# Patient Record
Sex: FEMALE | Race: WHITE | NOT HISPANIC OR LATINO | Employment: UNEMPLOYED | URBAN - METROPOLITAN AREA
[De-identification: names, ages, dates, MRNs, and addresses within clinical notes are randomized per-mention and may not be internally consistent; named-entity substitution may affect disease eponyms.]

---

## 2021-03-31 ENCOUNTER — HOSPITAL ENCOUNTER (EMERGENCY)
Facility: HOSPITAL | Age: 66
Discharge: HOME | End: 2021-03-31
Payer: MEDICARE

## 2021-03-31 VITALS
BODY MASS INDEX: 22.49 KG/M2 | OXYGEN SATURATION: 98 % | TEMPERATURE: 98 F | WEIGHT: 135 LBS | HEART RATE: 78 BPM | SYSTOLIC BLOOD PRESSURE: 130 MMHG | DIASTOLIC BLOOD PRESSURE: 72 MMHG | HEIGHT: 65 IN | RESPIRATION RATE: 20 BRPM

## 2021-03-31 DIAGNOSIS — M79.642 LEFT HAND PAIN: ICD-10-CM

## 2021-03-31 DIAGNOSIS — S63.502A SPRAIN OF LEFT WRIST, INITIAL ENCOUNTER: Primary | ICD-10-CM

## 2021-03-31 PROCEDURE — 73130 X-RAY EXAM OF HAND: CPT | Mod: TC,FY,LT

## 2021-03-31 PROCEDURE — 73130 XR HAND COMPLETE 3 VIEW LEFT: ICD-10-PCS | Mod: 26,LT,, | Performed by: RADIOLOGY

## 2021-03-31 PROCEDURE — 99283 EMERGENCY DEPT VISIT LOW MDM: CPT | Mod: 25

## 2021-03-31 PROCEDURE — 73130 X-RAY EXAM OF HAND: CPT | Mod: 26,LT,, | Performed by: RADIOLOGY

## 2022-12-27 ENCOUNTER — HOSPITAL ENCOUNTER (EMERGENCY)
Facility: HOSPITAL | Age: 67
Discharge: ANOTHER HOSPITAL | End: 2022-12-27
Attending: FAMILY MEDICINE
Payer: MEDICARE

## 2022-12-27 VITALS
HEIGHT: 65 IN | RESPIRATION RATE: 10 BRPM | SYSTOLIC BLOOD PRESSURE: 119 MMHG | DIASTOLIC BLOOD PRESSURE: 66 MMHG | TEMPERATURE: 99 F | OXYGEN SATURATION: 96 % | BODY MASS INDEX: 23.99 KG/M2 | WEIGHT: 144 LBS | HEART RATE: 66 BPM

## 2022-12-27 DIAGNOSIS — K35.80 ACUTE APPENDICITIS, UNSPECIFIED ACUTE APPENDICITIS TYPE: Primary | ICD-10-CM

## 2022-12-27 LAB
ALBUMIN SERPL BCP-MCNC: 4.1 G/DL (ref 3.5–5.2)
ALP SERPL-CCNC: 75 U/L (ref 55–135)
ALT SERPL W/O P-5'-P-CCNC: 36 U/L (ref 10–44)
ANION GAP SERPL CALC-SCNC: 13 MMOL/L (ref 8–16)
AST SERPL-CCNC: 25 U/L (ref 10–40)
BASOPHILS # BLD AUTO: 0.04 K/UL (ref 0–0.2)
BASOPHILS NFR BLD: 0.3 % (ref 0–1.9)
BILIRUB SERPL-MCNC: 1.2 MG/DL (ref 0.1–1)
BILIRUB UR QL STRIP: NEGATIVE
BUN SERPL-MCNC: 12 MG/DL (ref 8–23)
CALCIUM SERPL-MCNC: 9.3 MG/DL (ref 8.7–10.5)
CHLORIDE SERPL-SCNC: 106 MMOL/L (ref 95–110)
CLARITY UR: CLEAR
CO2 SERPL-SCNC: 21 MMOL/L (ref 23–29)
COLOR UR: YELLOW
CREAT SERPL-MCNC: 0.9 MG/DL (ref 0.5–1.4)
DIFFERENTIAL METHOD: ABNORMAL
EOSINOPHIL # BLD AUTO: 0.1 K/UL (ref 0–0.5)
EOSINOPHIL NFR BLD: 0.5 % (ref 0–8)
ERYTHROCYTE [DISTWIDTH] IN BLOOD BY AUTOMATED COUNT: 12.5 % (ref 11.5–14.5)
EST. GFR  (NO RACE VARIABLE): >60 ML/MIN/1.73 M^2
GLUCOSE SERPL-MCNC: 171 MG/DL (ref 70–110)
GLUCOSE UR QL STRIP: NEGATIVE
HCT VFR BLD AUTO: 38.5 % (ref 37–48.5)
HCV AB SERPL QL IA: NORMAL
HGB BLD-MCNC: 13 G/DL (ref 12–16)
HGB UR QL STRIP: ABNORMAL
HIV 1+2 AB+HIV1 P24 AG SERPL QL IA: NORMAL
IMM GRANULOCYTES # BLD AUTO: 0.07 K/UL (ref 0–0.04)
IMM GRANULOCYTES NFR BLD AUTO: 0.5 % (ref 0–0.5)
KETONES UR QL STRIP: ABNORMAL
LEUKOCYTE ESTERASE UR QL STRIP: ABNORMAL
LIPASE SERPL-CCNC: 25 U/L (ref 4–60)
LYMPHOCYTES # BLD AUTO: 1.3 K/UL (ref 1–4.8)
LYMPHOCYTES NFR BLD: 9.6 % (ref 18–48)
MCH RBC QN AUTO: 31 PG (ref 27–31)
MCHC RBC AUTO-ENTMCNC: 33.8 G/DL (ref 32–36)
MCV RBC AUTO: 92 FL (ref 82–98)
MICROSCOPIC COMMENT: ABNORMAL
MONOCYTES # BLD AUTO: 0.5 K/UL (ref 0.3–1)
MONOCYTES NFR BLD: 3.6 % (ref 4–15)
NEUTROPHILS # BLD AUTO: 11.7 K/UL (ref 1.8–7.7)
NEUTROPHILS NFR BLD: 85.5 % (ref 38–73)
NITRITE UR QL STRIP: NEGATIVE
NRBC BLD-RTO: 0 /100 WBC
PH UR STRIP: 6 [PH] (ref 5–8)
PLATELET # BLD AUTO: 215 K/UL (ref 150–450)
PMV BLD AUTO: 9.9 FL (ref 9.2–12.9)
POTASSIUM SERPL-SCNC: 3.6 MMOL/L (ref 3.5–5.1)
PROT SERPL-MCNC: 6.8 G/DL (ref 6–8.4)
PROT UR QL STRIP: NEGATIVE
RBC # BLD AUTO: 4.19 M/UL (ref 4–5.4)
RBC #/AREA URNS HPF: 8 /HPF (ref 0–4)
SODIUM SERPL-SCNC: 140 MMOL/L (ref 136–145)
SP GR UR STRIP: >=1.03 (ref 1–1.03)
URN SPEC COLLECT METH UR: ABNORMAL
UROBILINOGEN UR STRIP-ACNC: NEGATIVE EU/DL
WBC # BLD AUTO: 13.7 K/UL (ref 3.9–12.7)
WBC #/AREA URNS HPF: 3 /HPF (ref 0–5)

## 2022-12-27 PROCEDURE — 63600175 PHARM REV CODE 636 W HCPCS: Performed by: NURSE PRACTITIONER

## 2022-12-27 PROCEDURE — 63600175 PHARM REV CODE 636 W HCPCS: Performed by: FAMILY MEDICINE

## 2022-12-27 PROCEDURE — 96361 HYDRATE IV INFUSION ADD-ON: CPT

## 2022-12-27 PROCEDURE — 96376 TX/PRO/DX INJ SAME DRUG ADON: CPT

## 2022-12-27 PROCEDURE — 74176 CT ABDOMEN PELVIS WITHOUT CONTRAST: ICD-10-PCS | Mod: 26,,, | Performed by: RADIOLOGY

## 2022-12-27 PROCEDURE — 74176 CT ABD & PELVIS W/O CONTRAST: CPT | Mod: 26,,, | Performed by: RADIOLOGY

## 2022-12-27 PROCEDURE — 99285 EMERGENCY DEPT VISIT HI MDM: CPT | Mod: 25

## 2022-12-27 PROCEDURE — 81000 URINALYSIS NONAUTO W/SCOPE: CPT | Performed by: NURSE PRACTITIONER

## 2022-12-27 PROCEDURE — 86803 HEPATITIS C AB TEST: CPT | Performed by: FAMILY MEDICINE

## 2022-12-27 PROCEDURE — 96374 THER/PROPH/DIAG INJ IV PUSH: CPT

## 2022-12-27 PROCEDURE — 74176 CT ABD & PELVIS W/O CONTRAST: CPT | Mod: TC

## 2022-12-27 PROCEDURE — 83690 ASSAY OF LIPASE: CPT | Performed by: NURSE PRACTITIONER

## 2022-12-27 PROCEDURE — 25000003 PHARM REV CODE 250: Performed by: NURSE PRACTITIONER

## 2022-12-27 PROCEDURE — 87389 HIV-1 AG W/HIV-1&-2 AB AG IA: CPT | Performed by: FAMILY MEDICINE

## 2022-12-27 PROCEDURE — 80053 COMPREHEN METABOLIC PANEL: CPT | Performed by: NURSE PRACTITIONER

## 2022-12-27 PROCEDURE — 96375 TX/PRO/DX INJ NEW DRUG ADDON: CPT

## 2022-12-27 PROCEDURE — 85025 COMPLETE CBC W/AUTO DIFF WBC: CPT | Performed by: NURSE PRACTITIONER

## 2022-12-27 RX ORDER — LEVOTHYROXINE SODIUM 50 UG/1
50 TABLET ORAL EVERY MORNING
COMMUNITY
Start: 2022-11-17

## 2022-12-27 RX ORDER — ZOLPIDEM TARTRATE 5 MG/1
5 TABLET ORAL NIGHTLY PRN
COMMUNITY
Start: 2022-11-28

## 2022-12-27 RX ORDER — MORPHINE SULFATE 4 MG/ML
2 INJECTION, SOLUTION INTRAMUSCULAR; INTRAVENOUS
Status: COMPLETED | OUTPATIENT
Start: 2022-12-27 | End: 2022-12-27

## 2022-12-27 RX ORDER — KETOROLAC TROMETHAMINE 30 MG/ML
15 INJECTION, SOLUTION INTRAMUSCULAR; INTRAVENOUS
Status: DISCONTINUED | OUTPATIENT
Start: 2022-12-27 | End: 2022-12-27

## 2022-12-27 RX ORDER — ONDANSETRON 2 MG/ML
4 INJECTION INTRAMUSCULAR; INTRAVENOUS
Status: COMPLETED | OUTPATIENT
Start: 2022-12-27 | End: 2022-12-27

## 2022-12-27 RX ORDER — ATORVASTATIN CALCIUM 10 MG/1
10 TABLET, FILM COATED ORAL
COMMUNITY
Start: 2022-09-22

## 2022-12-27 RX ADMIN — ONDANSETRON 4 MG: 2 INJECTION INTRAMUSCULAR; INTRAVENOUS at 10:12

## 2022-12-27 RX ADMIN — MORPHINE SULFATE 2 MG: 4 INJECTION INTRAVENOUS at 03:12

## 2022-12-27 RX ADMIN — ONDANSETRON 4 MG: 2 INJECTION INTRAMUSCULAR; INTRAVENOUS at 12:12

## 2022-12-27 RX ADMIN — MORPHINE SULFATE 2 MG: 4 INJECTION INTRAVENOUS at 12:12

## 2022-12-27 RX ADMIN — MORPHINE SULFATE 2 MG: 4 INJECTION INTRAVENOUS at 11:12

## 2022-12-27 RX ADMIN — SODIUM CHLORIDE 1000 ML: 9 INJECTION, SOLUTION INTRAVENOUS at 10:12

## 2022-12-27 NOTE — ED NOTES
In to see pt at this time. Pt aaox4. Pt states she started having abdominal pain last night that has progressively gotten worse and started having n/v this morning. Pt states she has had no other precipitating symptoms apart from the abdominal pain. Pt states she is having pain of 8/10 in her abd at this time. Pt has no other needs or complaints at this time.

## 2022-12-27 NOTE — ED PROVIDER NOTES
Encounter Date: 12/27/2022       History     Chief Complaint   Patient presents with    Abdominal Pain     Generalized abdominal pain starting last night with associated n/v. Pt reports soft bm this am.      67-year-old female presents to the ED complaining of periumbilical abdominal pain associated with nausea vomiting morning the abdominal pain has more localized to the right lower quadrant, no history fever chills the patient has had loss of appetite her last meal was last night over 12 hours prior to arrival she has no history of abdominal surgery    Review of patient's allergies indicates:  No Known Allergies  Past Medical History:   Diagnosis Date    High cholesterol     Hypothyroidism, unspecified      History reviewed. No pertinent surgical history.  No family history on file.  Social History     Tobacco Use    Smoking status: Never   Substance Use Topics    Alcohol use: Never    Drug use: Never     Review of Systems   Constitutional:  Negative for fever.   HENT:  Negative for sore throat.    Respiratory:  Negative for shortness of breath.    Cardiovascular:  Negative for chest pain.   Gastrointestinal:  Positive for abdominal pain and nausea. Negative for anal bleeding, blood in stool and constipation.   Genitourinary:  Negative for dysuria.   Musculoskeletal:  Negative for back pain.   Skin:  Negative for rash.   Neurological:  Negative for weakness.   Hematological:  Does not bruise/bleed easily.   Psychiatric/Behavioral:  Negative for agitation.      Physical Exam     Initial Vitals [12/27/22 1029]   BP Pulse Resp Temp SpO2   (!) 100/59 (!) 52 (!) 24 97.8 °F (36.6 °C) 100 %      MAP       --         Physical Exam    Nursing note and vitals reviewed.  Constitutional: She appears well-developed and well-nourished. She is not diaphoretic. No distress.   HENT:   Head: Normocephalic and atraumatic.   Eyes: Pupils are equal, round, and reactive to light. Right eye exhibits no discharge. Left eye exhibits  no discharge.   Neck: No tracheal deviation present. No JVD present.   Cardiovascular:      Exam reveals no friction rub.       No murmur heard.  Pulmonary/Chest: No stridor. No respiratory distress. She has no wheezes. She has no rales.   Abdominal: Bowel sounds are normal. She exhibits no distension. There is abdominal tenderness. There is no rebound and no guarding.   Musculoskeletal:         General: Normal range of motion.     Neurological: She is alert.   Skin: Skin is warm.   Psychiatric: She has a normal mood and affect.       ED Course   Procedures  Labs Reviewed   CBC W/ AUTO DIFFERENTIAL - Abnormal; Notable for the following components:       Result Value    WBC 13.70 (*)     Gran # (ANC) 11.7 (*)     Immature Grans (Abs) 0.07 (*)     Gran % 85.5 (*)     Lymph % 9.6 (*)     Mono % 3.6 (*)     All other components within normal limits    Narrative:     Release to patient->Immediate   COMPREHENSIVE METABOLIC PANEL - Abnormal; Notable for the following components:    CO2 21 (*)     Glucose 171 (*)     Total Bilirubin 1.2 (*)     All other components within normal limits    Narrative:     Release to patient->Immediate   URINALYSIS, REFLEX TO URINE CULTURE - Abnormal; Notable for the following components:    Specific Gravity, UA >=1.030 (*)     Ketones, UA 3+ (*)     Occult Blood UA 2+ (*)     Leukocytes, UA Trace (*)     All other components within normal limits    Narrative:     Preferred Collection Type->Urine, Clean Catch  Specimen Source->Urine   URINALYSIS MICROSCOPIC - Abnormal; Notable for the following components:    RBC, UA 8 (*)     All other components within normal limits    Narrative:     Preferred Collection Type->Urine, Clean Catch  Specimen Source->Urine   HIV 1 / 2 ANTIBODY    Narrative:     Release to patient->Immediate   HEPATITIS C ANTIBODY    Narrative:     Release to patient->Immediate   LIPASE    Narrative:     Release to patient->Immediate          Imaging Results              CT  Abdomen Pelvis  Without Contrast (Final result)  Result time 12/27/22 12:16:17      Final result by Diana Reece MD (12/27/22 12:16:17)                   Impression:      Findings consistent acute appendicitis without perforation or abscess.    Results called to Dr. Tate at 12:15 hours 12/27/2022      Electronically signed by: Diana Reece MD  Date:    12/27/2022  Time:    12:16               Narrative:    EXAMINATION:  CT ABDOMEN PELVIS WITHOUT CONTRAST    CLINICAL HISTORY:  Abdominal pain, acute, nonlocalized;    TECHNIQUE:  Low dose axial images, sagittal and coronal reformations were obtained from the lung bases to the pubic symphysis.  No p.o. or IV contrast    COMPARISON:  None    FINDINGS:  There are mild hypoventilatory changes in visualized lung bases    Liver and spleen unremarkable appearance. No calcified stones the gallbladder or CT findings of acute cholecystitis. No biliary duct dilatation. Adrenal glands unremarkable appearance. Pancreas unremarkable appearance.  Abdominal aorta tapers without aneurysmal dilatation    Kidneys unremarkable appearance. No hydronephrosis opaque renal or ureteral stone or ureteral obstruction. Urinary bladder mildly distended at time of the exam and as visualized is unremarkable in appearance    Reproductive organs as visualized unremarkable appearance for the patient's age    Osseous structures; degenerative changes without obvious aggressive appearing osseous lesion    Stomach, bowel, mesentery; mild diverticulosis without CT findings of acute diverticulitis    Dilated thick-walled appendix with periappendiceal fat stranding and appendicolith. Appendix measuring approximately 11 mm in diameter.    No free intraperitoneal air or fluid. No abscess.                                       Medications   ondansetron injection 4 mg (4 mg Intravenous Given 12/27/22 1054)   sodium chloride 0.9% bolus 1,000 mL 1,000 mL (0 mLs Intravenous Stopped 12/27/22 1159)    morphine injection 2 mg (2 mg Intravenous Given 12/27/22 1140)   ondansetron injection 4 mg (4 mg Intravenous Given 12/27/22 1238)   morphine injection 2 mg (2 mg Intravenous Given 12/27/22 1238)   morphine injection 2 mg (2 mg Intravenous Given 12/27/22 1518)                 ED Course as of 01/02/23 0750   Tue Dec 27, 2022   1225 There is no general surgeon on-call at [facility] today transfer center coordinator contacted [WK]   1706 Patient resting quietly, patient will be going to [facility] [WK]      ED Course User Index  [WK] Reji Dukes MD                 Clinical Impression:   Final diagnoses:  [K35.80] Acute appendicitis, unspecified acute appendicitis type (Primary)        ED Disposition Condition    Transfer to Another Facility Stable                Reji Dukes MD  01/02/23 8554

## 2023-02-09 ENCOUNTER — OFFICE VISIT (OUTPATIENT)
Dept: PODIATRY | Facility: CLINIC | Age: 68
End: 2023-02-09
Payer: MEDICARE

## 2023-02-09 VITALS
BODY MASS INDEX: 23.72 KG/M2 | HEART RATE: 83 BPM | DIASTOLIC BLOOD PRESSURE: 77 MMHG | WEIGHT: 142.38 LBS | HEIGHT: 65 IN | SYSTOLIC BLOOD PRESSURE: 112 MMHG

## 2023-02-09 DIAGNOSIS — M72.2 PLANTAR FASCIITIS: Primary | ICD-10-CM

## 2023-02-09 PROCEDURE — 1160F RVW MEDS BY RX/DR IN RCRD: CPT | Mod: CPTII,S$GLB,, | Performed by: PODIATRIST

## 2023-02-09 PROCEDURE — 3074F PR MOST RECENT SYSTOLIC BLOOD PRESSURE < 130 MM HG: ICD-10-PCS | Mod: CPTII,S$GLB,, | Performed by: PODIATRIST

## 2023-02-09 PROCEDURE — 1125F AMNT PAIN NOTED PAIN PRSNT: CPT | Mod: CPTII,S$GLB,, | Performed by: PODIATRIST

## 2023-02-09 PROCEDURE — 3008F BODY MASS INDEX DOCD: CPT | Mod: CPTII,S$GLB,, | Performed by: PODIATRIST

## 2023-02-09 PROCEDURE — 3008F PR BODY MASS INDEX (BMI) DOCUMENTED: ICD-10-PCS | Mod: CPTII,S$GLB,, | Performed by: PODIATRIST

## 2023-02-09 PROCEDURE — 99999 PR PBB SHADOW E&M-EST. PATIENT-LVL III: CPT | Mod: PBBFAC,,, | Performed by: PODIATRIST

## 2023-02-09 PROCEDURE — 1159F PR MEDICATION LIST DOCUMENTED IN MEDICAL RECORD: ICD-10-PCS | Mod: CPTII,S$GLB,, | Performed by: PODIATRIST

## 2023-02-09 PROCEDURE — 1159F MED LIST DOCD IN RCRD: CPT | Mod: CPTII,S$GLB,, | Performed by: PODIATRIST

## 2023-02-09 PROCEDURE — 1160F PR REVIEW ALL MEDS BY PRESCRIBER/CLIN PHARMACIST DOCUMENTED: ICD-10-PCS | Mod: CPTII,S$GLB,, | Performed by: PODIATRIST

## 2023-02-09 PROCEDURE — 99203 OFFICE O/P NEW LOW 30 MIN: CPT | Mod: S$GLB,,, | Performed by: PODIATRIST

## 2023-02-09 PROCEDURE — 3074F SYST BP LT 130 MM HG: CPT | Mod: CPTII,S$GLB,, | Performed by: PODIATRIST

## 2023-02-09 PROCEDURE — 3078F DIAST BP <80 MM HG: CPT | Mod: CPTII,S$GLB,, | Performed by: PODIATRIST

## 2023-02-09 PROCEDURE — 99999 PR PBB SHADOW E&M-EST. PATIENT-LVL III: ICD-10-PCS | Mod: PBBFAC,,, | Performed by: PODIATRIST

## 2023-02-09 PROCEDURE — 99203 PR OFFICE/OUTPT VISIT, NEW, LEVL III, 30-44 MIN: ICD-10-PCS | Mod: S$GLB,,, | Performed by: PODIATRIST

## 2023-02-09 PROCEDURE — 3078F PR MOST RECENT DIASTOLIC BLOOD PRESSURE < 80 MM HG: ICD-10-PCS | Mod: CPTII,S$GLB,, | Performed by: PODIATRIST

## 2023-02-09 PROCEDURE — 1125F PR PAIN SEVERITY QUANTIFIED, PAIN PRESENT: ICD-10-PCS | Mod: CPTII,S$GLB,, | Performed by: PODIATRIST

## 2023-02-09 RX ORDER — DICLOFENAC SODIUM 75 MG/1
75 TABLET, DELAYED RELEASE ORAL 2 TIMES DAILY
Qty: 60 TABLET | Refills: 1 | Status: SHIPPED | OUTPATIENT
Start: 2023-02-09 | End: 2023-03-11

## 2023-02-12 NOTE — PROGRESS NOTES
"Subjective:       Patient ID: Marina Stout is a 67 y.o. female.    Chief Complaint: Foot Problem and Foot Pain  Patient presents today for a new patient evaluation she relates a history of heel pain right she also fell and twisted her right foot spraining her right foot approximately 6 weeks ago she states at this time it is about 60% better her main complaint right now is her right heel pain.  Patient states she is on her feet all of the time she states she is also now suffering sciatic nerve pain most likely from compensation and the way she is currently walking.    Past Medical History:   Diagnosis Date    High cholesterol     Hypothyroidism, unspecified      Past Surgical History:   Procedure Laterality Date    APPENDECTOMY       Family History   Problem Relation Age of Onset    Diabetes Mother     Heart disease Mother     Diabetes Father     Heart disease Father      Social History     Socioeconomic History    Marital status:    Tobacco Use    Smoking status: Never   Substance and Sexual Activity    Alcohol use: Never    Drug use: Never    Sexual activity: Not Currently     Partners: Male       Current Outpatient Medications   Medication Sig Dispense Refill    atorvastatin (LIPITOR) 10 MG tablet Take 10 mg by mouth.      levothyroxine (SYNTHROID) 50 MCG tablet Take 50 mcg by mouth every morning.      zolpidem (AMBIEN) 5 MG Tab Take 5 mg by mouth nightly as needed.      diclofenac (VOLTAREN) 75 MG EC tablet Take 1 tablet (75 mg total) by mouth 2 (two) times daily. 60 tablet 1     No current facility-administered medications for this visit.     Review of patient's allergies indicates:  No Known Allergies    Review of Systems   Musculoskeletal:  Positive for arthralgias.   All other systems reviewed and are negative.    Objective:      Vitals:    02/09/23 1505   BP: 112/77   Pulse: 83   Weight: 64.6 kg (142 lb 6.4 oz)   Height: 5' 5" (1.651 m)     Physical Exam  Vitals and nursing note reviewed. "   Constitutional:       Appearance: Normal appearance.   Cardiovascular:      Pulses:           Dorsalis pedis pulses are 2+ on the right side and 2+ on the left side.        Posterior tibial pulses are 1+ on the right side and 1+ on the left side.   Musculoskeletal:         General: Swelling, tenderness and signs of injury present.        Feet:    Feet:      Right foot:      Protective Sensation: 2 sites tested.  2 sites sensed.      Skin integrity: Erythema and warmth present.      Left foot:      Protective Sensation: 2 sites tested.  2 sites sensed.   Skin:     Capillary Refill: Capillary refill takes 2 to 3 seconds.      Findings: Erythema present.   Neurological:      General: No focal deficit present.      Mental Status: She is alert.   Psychiatric:         Mood and Affect: Mood normal.         Behavior: Behavior normal.                        Assessment:       1. Plantar fasciitis          Plan:       Patient presents today for a new patient evaluation she relates a history of heel pain right she also fell and twisted her right foot spraining her right foot approximately 6 weeks ago she states at this time it is about 60% better her main complaint right now is her right heel pain.  Patient states she is on her feet all of the time she states she is also now suffering sciatic nerve pain most likely from compensation and the way she is currently walking.  A comprehensive new patient evaluation was performed patient does have some tenderness overlying the lateral compartment of the patient's right foot and ankle however this has improved there is a little bit of inflammation over the area the patient's primary concern right now is her right heel pain which is consistent with plantar fasciitis.  Patient has significantly elevated arches both weight-bearing and nonweightbearing bilateral I did discuss plantar fasciitis in detail with the patient she has taken ibuprofen which has helped a little bit I did explain  to the patient the need for appropriate support at all times she does have Vionic shoes which have helped a little bit I did add blue arch padding to the patient's shoes for the right side I gave the patient additional pads showing her where to put these advising her she needs to use these at all times of weight-bearing I am going to re-evaluate her in 2 weeks to ensure that she is doing better and tolerating the arch support I did explain to the patient we may have to give her more arch support depending upon how she responds to the arch support dispensed today.  Patient started on diclofenac she will discontinue all other anti-inflammatories I also recommended a Celestone and Toradol injection IM however the patient deferred at this time.  Re-evaluation at follow-up.This note was created using MTalkTo voice recognition software that occasionally misinterpreted phrases or words.

## 2023-02-23 ENCOUNTER — OFFICE VISIT (OUTPATIENT)
Dept: PODIATRY | Facility: CLINIC | Age: 68
End: 2023-02-23
Payer: MEDICARE

## 2023-02-23 VITALS
WEIGHT: 142.44 LBS | DIASTOLIC BLOOD PRESSURE: 76 MMHG | SYSTOLIC BLOOD PRESSURE: 116 MMHG | HEIGHT: 65 IN | BODY MASS INDEX: 23.73 KG/M2 | HEART RATE: 70 BPM

## 2023-02-23 DIAGNOSIS — M72.2 PLANTAR FASCIITIS: Primary | ICD-10-CM

## 2023-02-23 PROCEDURE — 3074F PR MOST RECENT SYSTOLIC BLOOD PRESSURE < 130 MM HG: ICD-10-PCS | Mod: CPTII,S$GLB,, | Performed by: PODIATRIST

## 2023-02-23 PROCEDURE — 1160F PR REVIEW ALL MEDS BY PRESCRIBER/CLIN PHARMACIST DOCUMENTED: ICD-10-PCS | Mod: CPTII,S$GLB,, | Performed by: PODIATRIST

## 2023-02-23 PROCEDURE — 1159F MED LIST DOCD IN RCRD: CPT | Mod: CPTII,S$GLB,, | Performed by: PODIATRIST

## 2023-02-23 PROCEDURE — 1125F AMNT PAIN NOTED PAIN PRSNT: CPT | Mod: CPTII,S$GLB,, | Performed by: PODIATRIST

## 2023-02-23 PROCEDURE — 3078F PR MOST RECENT DIASTOLIC BLOOD PRESSURE < 80 MM HG: ICD-10-PCS | Mod: CPTII,S$GLB,, | Performed by: PODIATRIST

## 2023-02-23 PROCEDURE — 3074F SYST BP LT 130 MM HG: CPT | Mod: CPTII,S$GLB,, | Performed by: PODIATRIST

## 2023-02-23 PROCEDURE — 3288F FALL RISK ASSESSMENT DOCD: CPT | Mod: CPTII,S$GLB,, | Performed by: PODIATRIST

## 2023-02-23 PROCEDURE — 99999 PR PBB SHADOW E&M-EST. PATIENT-LVL III: ICD-10-PCS | Mod: PBBFAC,,, | Performed by: PODIATRIST

## 2023-02-23 PROCEDURE — 1125F PR PAIN SEVERITY QUANTIFIED, PAIN PRESENT: ICD-10-PCS | Mod: CPTII,S$GLB,, | Performed by: PODIATRIST

## 2023-02-23 PROCEDURE — 99999 PR PBB SHADOW E&M-EST. PATIENT-LVL III: CPT | Mod: PBBFAC,,, | Performed by: PODIATRIST

## 2023-02-23 PROCEDURE — 1159F PR MEDICATION LIST DOCUMENTED IN MEDICAL RECORD: ICD-10-PCS | Mod: CPTII,S$GLB,, | Performed by: PODIATRIST

## 2023-02-23 PROCEDURE — 3288F PR FALLS RISK ASSESSMENT DOCUMENTED: ICD-10-PCS | Mod: CPTII,S$GLB,, | Performed by: PODIATRIST

## 2023-02-23 PROCEDURE — 3008F PR BODY MASS INDEX (BMI) DOCUMENTED: ICD-10-PCS | Mod: CPTII,S$GLB,, | Performed by: PODIATRIST

## 2023-02-23 PROCEDURE — 1160F RVW MEDS BY RX/DR IN RCRD: CPT | Mod: CPTII,S$GLB,, | Performed by: PODIATRIST

## 2023-02-23 PROCEDURE — 99213 OFFICE O/P EST LOW 20 MIN: CPT | Mod: S$GLB,,, | Performed by: PODIATRIST

## 2023-02-23 PROCEDURE — 99213 PR OFFICE/OUTPT VISIT, EST, LEVL III, 20-29 MIN: ICD-10-PCS | Mod: S$GLB,,, | Performed by: PODIATRIST

## 2023-02-23 PROCEDURE — 3078F DIAST BP <80 MM HG: CPT | Mod: CPTII,S$GLB,, | Performed by: PODIATRIST

## 2023-02-23 PROCEDURE — 3008F BODY MASS INDEX DOCD: CPT | Mod: CPTII,S$GLB,, | Performed by: PODIATRIST

## 2023-02-23 PROCEDURE — 1100F PTFALLS ASSESS-DOCD GE2>/YR: CPT | Mod: CPTII,S$GLB,, | Performed by: PODIATRIST

## 2023-02-23 PROCEDURE — 1100F PR PT FALLS ASSESS DOC 2+ FALLS/FALL W/INJURY/YR: ICD-10-PCS | Mod: CPTII,S$GLB,, | Performed by: PODIATRIST

## 2023-02-23 RX ORDER — NICOTINE POLACRILEX 2 MG
1 GUM BUCCAL
COMMUNITY

## 2023-02-23 RX ORDER — HYDROCODONE BITARTRATE AND ACETAMINOPHEN 5; 325 MG/1; MG/1
TABLET ORAL
COMMUNITY
Start: 2022-12-29

## 2023-02-26 NOTE — PROGRESS NOTES
"Subjective:       Patient ID: Marina Stout is a 67 y.o. female.    Chief Complaint: Follow-up (Plantar fascitis right foot)  Patient presents for follow-up plantar fasciitis right.    Past Medical History:   Diagnosis Date    High cholesterol     Hypothyroidism, unspecified      Past Surgical History:   Procedure Laterality Date    APPENDECTOMY       Family History   Problem Relation Age of Onset    Diabetes Mother     Heart disease Mother     Diabetes Father     Heart disease Father      Social History     Socioeconomic History    Marital status:    Tobacco Use    Smoking status: Never   Substance and Sexual Activity    Alcohol use: Never    Drug use: Never    Sexual activity: Not Currently     Partners: Male       Current Outpatient Medications   Medication Sig Dispense Refill    atorvastatin (LIPITOR) 10 MG tablet Take 10 mg by mouth.      biotin 1 mg Cap Take 1 capsule by mouth.      diclofenac (VOLTAREN) 75 MG EC tablet Take 1 tablet (75 mg total) by mouth 2 (two) times daily. 60 tablet 1    HYDROcodone-acetaminophen (NORCO) 5-325 mg per tablet Take by mouth.      levothyroxine (SYNTHROID) 50 MCG tablet Take 50 mcg by mouth every morning.      zolpidem (AMBIEN) 5 MG Tab Take 5 mg by mouth nightly as needed.       No current facility-administered medications for this visit.     Review of patient's allergies indicates:  No Known Allergies    Review of Systems   Musculoskeletal:  Positive for arthralgias.   All other systems reviewed and are negative.    Objective:      Vitals:    02/23/23 0915   BP: 116/76   Pulse: 70   Weight: 64.6 kg (142 lb 6.7 oz)   Height: 5' 5" (1.651 m)     Physical Exam  Vitals and nursing note reviewed.   Constitutional:       Appearance: Normal appearance.   Cardiovascular:      Pulses:           Dorsalis pedis pulses are 2+ on the right side and 2+ on the left side.        Posterior tibial pulses are 1+ on the right side and 1+ on the left side.   Musculoskeletal:         " General: Swelling, tenderness and signs of injury present.        Feet:    Feet:      Right foot:      Protective Sensation: 2 sites tested.  2 sites sensed.      Skin integrity: Erythema and warmth present.      Left foot:      Protective Sensation: 2 sites tested.  2 sites sensed.   Skin:     Capillary Refill: Capillary refill takes 2 to 3 seconds.      Findings: Erythema present.   Neurological:      General: No focal deficit present.      Mental Status: She is alert.   Psychiatric:         Mood and Affect: Mood normal.         Behavior: Behavior normal.                        Assessment:       1. Plantar fasciitis          Plan:       Patient presents for follow-up plantar fasciitis right.  Patient states she is definitely doing better she is taking the diclofenac which has worked well for her she states she is not really having a lot of heel pain she is having a little bit of soreness from time to time.  I did recommend changing out the arch pad that I have previously dispensed to the patient trying something a little bit different changing the position also I dispensed the patient a compression sleeve for the right foot I want her to wear this in addition to the arch pad this is going to help to control the swelling give her better support and should eliminate her remaining discomfort patient will be seen as needed for follow-up I have advised her if she is not pain-free over the next couple of weeks to contact us for further evaluation and treat.  This note was created using [employer] voice recognition software that occasionally misinterpreted phrases or words.

## 2023-07-17 ENCOUNTER — TELEPHONE (OUTPATIENT)
Dept: PODIATRY | Facility: CLINIC | Age: 68
End: 2023-07-17
Payer: MEDICARE

## 2023-07-17 RX ORDER — DICLOFENAC SODIUM 75 MG/1
75 TABLET, DELAYED RELEASE ORAL 2 TIMES DAILY
Qty: 28 TABLET | Refills: 0 | Status: SHIPPED | OUTPATIENT
Start: 2023-07-17 | End: 2023-07-31

## 2024-01-29 DIAGNOSIS — M25.511 RIGHT SHOULDER PAIN: Primary | ICD-10-CM

## 2024-02-01 ENCOUNTER — CLINICAL SUPPORT (OUTPATIENT)
Dept: REHABILITATION | Facility: HOSPITAL | Age: 69
End: 2024-02-01
Payer: MEDICARE

## 2024-02-01 DIAGNOSIS — M25.511 RIGHT SHOULDER PAIN: ICD-10-CM

## 2024-02-01 DIAGNOSIS — R29.898 WEAKNESS OF RIGHT UPPER EXTREMITY: Primary | ICD-10-CM

## 2024-02-01 PROCEDURE — 97166 OT EVAL MOD COMPLEX 45 MIN: CPT | Mod: PN

## 2024-02-01 PROCEDURE — 97140 MANUAL THERAPY 1/> REGIONS: CPT | Mod: PN

## 2024-02-01 NOTE — PROGRESS NOTES
[facility]  Occupational Therapy Initial Evaluation      Name: Marina Stout  Clinic Number: [id number]    Therapy Diagnosis: No diagnosis found.  Physician: David Miller MD    Physician Orders: Eval and Treat  Medical Diagnosis: M25.511 (ICD-10-CM) - Right shoulder pain   Surgical Procedure and Date: None  Evaluation Date: 2/1/2024  Insurance Authorization Period Expiration: ***  Plan of Care Certification Period: ***  Date of Return to MD: ***  Visit # / Visits authorized: *** / ***  FOTO: ***/ 3    Precautions:  {IP WOUND PRECAUTIONS OHS:22968}    Time In: ***  Time Out: ***  Total Billable Time: *** minutes    Subjective      Date of Onset: ***    History of Current Condition/Mechanism of Injury: Marina reports: ***    Falls: ***    Involved Side: ***  Dominant Side: { hand dominance:3567389335}    Mechanism of Injury: ***  Surgical Procedure: ***  Imaging: {Mri/ctscan/bone scan:92829} ***    Prior Therapy: ***    Pain:  Functional Pain Scale Rating 0-10:   {NUMBERS; 0-10:5044}/10 on average  {NUMBERS; 0-10:5044}/10 at best  {NUMBERS; 0-10:5044}/10 at worst  Location: ***  Description: {Pain Description:60045}  Aggravating Factors: {Causes; Pain:12005}  Easing Factors: {Pain (activities that relieve):69588}    Occupation:  ***  Working presently: {Work history:78234}  Duties: ***    Functional Limitations/Social History:    Previous functional status includes: Independent with all ADLs. ***    Current Functional Status   Home/Living environment: {LIVES WITH:79174}    - *** story home, *** steps to enter    - DME: ***      Limitation of Functional Status as follows:   ADLs/IADLs:     - Feeding: ***    - Bathing: ***    - Dressing/Grooming: ***    - Home Management: ***    - Driving: ***     Leisure: {AMB OT HAND LEISURE:34482}    Patient's Goals for Therapy: ***    Past Medical History/Physical Systems Review:   Marina Stout  has a past medical history of High cholesterol and  Hypothyroidism, unspecified.    Marina Stout  has a past surgical history that includes Appendectomy.    Marina has a current medication list which includes the following prescription(s): atorvastatin, biotin, hydrocodone-acetaminophen, levothyroxine, and zolpidem.    Review of patient's allergies indicates:  No Known Allergies     Objective      Active Range of Motion Measurements: LUE  -Forward flexion:   -External rotation:  -Internal rotation:  -Abduction:  -Extension:    Active Range of Motion Measurements: RUE  -Forward flexion:   -External rotation:  -Internal rotation:  -Abduction:  -Extension:    Passive Range of Motion Measurements: LUE  -Forward flexion:   -Abduction:    Passive Range of Motion Measurements: RUE  -Forward flexion:   -Abduction:    Manual Muscle Test: LUE  -Shoulder Flexion:   -Shoulder Extension:   -Shoulder Abduction:  -Bicep Flexion:    Affected Side  Nino Shields:  Cross-Body Adduction:    Manual Muscle Test: RUE  -Shoulder Flexion:   -Shoulder Extension:   -Shoulder Abduction:  -Bicep Flexion:     Intake Outcome Measure for FOTO *** Survey    Therapist reviewed FOTO scores for Marina Stout on 2/1/2024.   FOTO report - see Media section or FOTO account episode details.    Intake Score: ***%       Treatment      Total Treatment time (time-based codes) separate from Evaluation: *** minutes    Marina received the treatments listed below:      therapeutic exercises to develop {AMB PT PROGRESS OBJECTIVE:94563} for *** minutes including:  ***  Scapular exercise  Distract and stretch  Shoulder rehab    manual therapy techniques: {AMB PT PROGRESS MANUAL THERAPY:69400} were applied to the: *** for *** minutes, including:  ***    neuromuscular re-education activities to improve: {AMB PT PROGRESS NEURO RE-ED:99169} for *** minutes. The following activities were included:  ***    therapeutic activities to improve functional performance for ***  minutes, including:  ***    gait training  "to improve functional mobility and safety for ***  minutes, including:  ***    direct contact modalities after being cleared for contraindications: {AMB PT PROGRESS DIRECT CONTACT MODES:77382}    supervised modalities after being cleared for contradictions: {AMB PT SUPERVISED MODES:08667}    hot pack for *** minutes to ***.    cold pack for *** minutes to ***.    Patient Education and Home Exercises      Education provided:   -role of OT, goals for OT, scheduling/cancellations, insurance limitations with patient.  -Additional Education provided: ***    Written Home Exercises Provided: {Blank single:58527::"yes","Patient instructed to cont prior HEP"}.  Exercises were reviewed and Marina was able to demonstrate them prior to the end of the session.    Marina demonstrated {Desc; good/fair/poor:81814} understanding of the education provided.     Pt was advised to perform these exercises free of pain, and to stop performing them if pain occurs.    See EMR under {Blank single:04866::"Media","Patient Instructions"} for exercises provided {Blank single:50900::"2/1/2024","prior visit"}.    Assessment      Marina Stout is a 68 y.o. female referred to outpatient occupational therapy and presents with a medical diagnosis of ***.    Following medical record review it is determined that pt will benefit from occupational therapy services in order to maximize pain free and/or functional use of {LEFT/RIGHT:12277} ***. The following goals were discussed with the patient and patient is in agreement with them as to be addressed in the treatment plan. The patient's rehab potential is {REHAB PROGNOSIS OHS:66486}.     Anticipated barriers to occupational therapy: ***    Plan of care discussed with patient: {YES:88795}  Patient's spiritual, cultural and educational needs considered and patient is agreeable to the plan of care and goals as stated below:     Medical Necessity is demonstrated by the following  Occupational " Profile/History  Co-morbidities and personal factors that may impact the plan of care [] LOW: Brief chart review  [] MODERATE: Expanded chart review   [] HIGH: Extensive chart review    Moderate / High Support Documentation: ***     Examination  Performance deficits relating to physical, cognitive or psychosocial skills that result in activity limitations and/or participation restrictions  [] LOW: addressing 1-3 Performance deficits  [] MODERATE: 3-5 Performance deficits  [] HIGH: 5+ Performance deficits (please support below)    Moderate / High Support Documentation:    Physical:  {Physical:52883}    Cognitive:  {Cognitive:85507}    Psychosocial:    {Psychosocial:98208}     Treatment Options [] LOW: Limited options  [] MODERATE: Several options  [] HIGH: Multiple options      Decision Making/ Complexity Score: {Desc; low/moderate/high:553456}       The following goals were discussed with the patient and patient is in agreement with them as to be addressed in the treatment plan.     Goals:   *** pain    Plan     Plan of Care Certification: 2/1/2024 to ***.     Outpatient Occupational Therapy 2 times weekly for 6 weeks to include the following interventions: Manual therapy/joint mobilizations, Modalities for pain management, Therapeutic exercises/activities., and Strengthening.    Mikaela Amador OT    Physician's Signature: _________________________________________ Date: ________________

## 2024-02-01 NOTE — PLAN OF CARE
[facility]  Occupational Therapy Initial Evaluation      Name: Marina Stout  Clinic Number: [id number]    Therapy Diagnosis: Weakness of right upper extremity [R29.898]   Physician: David Miller MD    Physician Orders: Eval and Treat  Medical Diagnosis: M25.511 (ICD-10-CM) - Right shoulder pain   Surgical Procedure and Date: None recently, history of rotator cuff repairs  Evaluation Date: 2/1/2024  Insurance Authorization Period Expiration: 04/07/24  Plan of Care Certification Period: 2/1/24-3/14/24  Date of Return to MD: ~3/14/24  Visit # / Visits authorized: 1 / 20  FOTO: 1/ 3    Precautions:  Standard    Time In: 2:45 PM  Time Out: 3:30 PM  Total Billable Time: 45 minutes    Subjective      Date of Onset: 2010    History of Current Condition/Mechanism of Injury: Marina reports: patient sustained a fall while walking on snow/ice in 2010, which resulted in injury to her right shoulder. Patient stated she received 3 rotator cuff repairs in 2011, 2012/2013, and 2014 following this incident. Patient received therapy following each repair, and patient avidly engaged in self-directed aquatic therapy to rehab her shoulder. Patient noted a cyst on her right shoulder and followed up with MD on overall right shoulder pain. Patient stated MD would like to proceed with a reverse shoulder replacement, but she wanted to try therapy as a conservative approach to decrease pain and increase range of motion. Patient is applying topical cream to right shoulder prescribed by MD 3-4X/day. Patient cannot lift her arm higher than shoulder height. She experiences great pain while sleeping as she sleeps on her right side.    Falls: None within the past 6 months    Involved Side: Right  Dominant Side: Right    Mechanism of Injury: fall, degenerative changes over time  Surgical Procedure: None recently, history of 3 rotator cuff repairs  Imaging: none on [facility] record    Prior Therapy: following each rotator  "cuff surgery, approximately [id number] last time    Pain:  Functional Pain Scale Rating 0-10:   4/10 on average  4/10 at best  10/10 at worst  Location: left shoulder   Description: Aching  Aggravating Factors: sleeping at night, decorating cakes, bike with moving handlebars, swimming, any activity involving right arm especially overhead  Easing Factors: ice and Tylenol/Advil, massage    Occupation:  None   Working presently: unemployed  Duties: homemaking, paint walls/trim, cook    Functional Limitations/Social History:    Previous functional status includes: Independent with all ADLs.     Current Functional Status   Home/Living environment: lives with their spouse    - 1 story home, "just a few" steps to enter    - DME: None      Limitation of Functional Status as follows:   ADLs/IADLs:     - Feeding: patient reported min right shoulder pain while feeding herself as she has adapted the positioning of her right shoulder to eat within range of shoulder height.     - Bathing: patient reported moderate pain reaching behind back. Patient requires max assist from positioning of right upper extremity on shower wall to wash her hair.    - Dressing/Grooming: patient reported no challenges dressing. Patient reported she requires max assist from positioning of right upper extremity on shower wall to style her hair.     - Home Management: patient reported moderate pain lifting overhead, donning fitted bed sheets, and performing cleaning tasks. Patient noted she is fatigued with prolonged use of right upper extremity. She stated she experiences maximum pain while peeling vegetables using her right upper extremity.    - Driving: patient is modified independent with primary use of her left upper extremity.     Leisure: indoor biking with moving arms    Patient's Goals for Therapy: "To get some strength back into it. I probably won't get the strength back that I lost. Dr. Miller told me that. I want to get rid of some of the " "pain."    Past Medical History/Physical Systems Review:   Marina Stout  has a past medical history of High cholesterol and Hypothyroidism, unspecified.    Marina Stout  has a past surgical history that includes Appendectomy.    Marina has a current medication list which includes the following prescription(s): atorvastatin, biotin, hydrocodone-acetaminophen, levothyroxine, and zolpidem.    Review of patient's allergies indicates:  No Known Allergies     Objective      Active Range of Motion Measurements: LUE  -Forward flexion: 105  -External rotation: 0  -Internal rotation: lumbar  -Abduction: 90  -Extension: 60    Active Range of Motion Measurements: RUE  -Forward flexion: 170  -External rotation: 70  -Internal rotation: T12  -Abduction: 170  -Extension: 60    Manual Muscle Test: LUE  -Shoulder Flexion: 3/5  -Shoulder Extension: 3+/5  -Shoulder Abduction: 3/5  -Bicep Flexion: 5/5    Manual Muscle Test: RUE  -Shoulder Flexion: 4/5  -Shoulder Extension: 4/5  -Shoulder Abduction: 4/5  -Bicep Flexion: 5/5    Intake Outcome Measure for FOTO Shoulder Survey    Therapist reviewed FOTO scores for Marina Stout on 2/1/2024.   FOTO report - see Media section or FOTO account episode details.    Intake Score: 54%       Treatment      Total Treatment time (time-based codes) separate from Evaluation: 12 minutes    Marina received the treatments listed below:      manual therapy techniques: Joint mobilizations were applied to the: right upper extremity for 12 minutes, including:  OT performed left shoulder passive range of motion forward flexion for 1 minute hold, 3 repetitions.  OT performed left shoulder passive range of motion scaption for 1 minute hold, 3 repetitions.  OT performed left shoulder passive range of motion external rotation for 1 minute hold, 2 repetitions and 40 second hold, 1 repetition.    Patient Education and Home Exercises      Education provided:   -role of OT, goals for OT, scheduling/cancellations, " insurance limitations with patient.  -Additional Education provided: home exercise program     Written Home Exercises Provided: yes.  Exercises were reviewed and Marina was able to demonstrate them prior to the end of the session.    Marina demonstrated good  understanding of the education provided.     Pt was advised to perform these exercises free of pain, and to stop performing them if pain occurs.    See EMR under Patient Instructions for exercises provided 2/1/2024.    Assessment      Marina Stout is a 68 y.o. female referred to outpatient occupational therapy and presents with a medical diagnosis of right shoulder pain. Patient presents with decreased engagement in ADL/IADL tasks due to consistent shoulder pain which worsens with functional use. Patient cannot lift her right upper extremity higher than approximately shoulder height. She demonstrated decreased muscular endurance, decreased muscular strength, decreased joint mobility, decreased activity tolerance, and increased pain. Patient had fair tolerance of manual techniques, but required one rest break in external rotation as this was was her tightest motion. Patient demonstrated understanding of home exercise program.    Following medical record review it is determined that pt will benefit from occupational therapy services in order to maximize pain free and/or functional use of right shoulder. The following goals were discussed with the patient and patient is in agreement with them as to be addressed in the treatment plan. The patient's rehab potential is Fair.     Anticipated barriers to occupational therapy: None    Plan of care discussed with patient: Yes  Patient's spiritual, cultural and educational needs considered and patient is agreeable to the plan of care and goals as stated below:     Medical Necessity is demonstrated by the following  Occupational Profile/History  Co-morbidities and personal factors that may impact the plan of care [x] LOW:  Brief chart review  [] MODERATE: Expanded chart review   [] HIGH: Extensive chart review    Moderate / High Support Documentation: N/A     Examination  Performance deficits relating to physical, cognitive or psychosocial skills that result in activity limitations and/or participation restrictions  [] LOW: addressing 1-3 Performance deficits  [] MODERATE: 3-5 Performance deficits  [x] HIGH: 5+ Performance deficits (please support below)    Moderate / High Support Documentation:    Physical:  Joint Mobility  Muscle Power/Strength  Muscle Endurance  Proprioception Functions  Muscle Tone  Pain    Cognitive:  No Deficits    Psychosocial:    No Deficits     Treatment Options [] LOW: Limited options  [x] MODERATE: Several options  [] HIGH: Multiple options      Decision Making/ Complexity Score: moderate       The following goals were discussed with the patient and patient is in agreement with them as to be addressed in the treatment plan.     Goals:   Short-term  Patient will demonstrate independence in home exercise program by 2/22/24.  Patient will demonstrate improved muscular endurance as evidenced by 2 or less rest breaks during manual techniques by 2/22/24.    Long-term  Patient will report 3/10 average pain in right upper extremity during functional and therapeutic tasks by 3/14/24.  Patient will report minimum right upper extremity pain in overhead grooming and bathing tasks by 3/14/24.  Patient will demonstrate 3+/5 MMT right shoulder flexion by 3/14/24.  Patient will demonstrate improved functional use of right upper extremity by 10% increase in FOTO score by 3/14/24.    Plan     Plan of Care Certification: 2/1/2024 to 3/14/24.     Outpatient Occupational Therapy 1 times weekly for 6 weeks to include the following interventions: Manual therapy/joint mobilizations, Modalities for pain management, Therapeutic exercises/activities., and Strengthening. Patient stated she would like to come once a week due to co-pay.  She stated she would like to begin her own aquatic therapy program alongside formal OT in lieu of a second visit each week.    Mikaela Amador, OT    Physician's Signature: _________________________________________ Date: ________________

## 2024-02-06 NOTE — PROGRESS NOTES
"[facility]  Occupational Therapy Treatment Note     Date: 2/7/2024  Name: Marina Stout  Clinic Number: [id number]     Therapy Diagnosis: Weakness of right upper extremity [R29.898]   Physician: David Miller MD     Physician Orders: Eval and Treat  Medical Diagnosis: M25.511 (ICD-10-CM) - Right shoulder pain   Surgical Procedure and Date: None recently, history of rotator cuff repairs  Evaluation Date: 2/1/2024  Insurance Authorization Period Expiration: 04/07/24  Plan of Care Certification Period: 2/1/24-3/14/24  Date of Return to MD: ~3/14/24  Visit # / Visits authorized: 1 / 20  FOTO: 1/ 3     Precautions:  Standard     Time In: 9:34 AM  Time Out: 10:17 AM  Total Billable Time: 43 minutes    Subjective     Patient reports: "I feel really great today."  She was compliant with home exercise program given last session.   Response to previous treatment: "It was good."  Functional change: patient reported no functional changes since last session.    Pain: 0/10  Location: right shoulder      Objective     Objective Measures updated at progress report unless specified.    Treatment     Marina received the treatments listed below:      therapeutic exercises to develop strength, endurance, ROM, and flexibility for 18 minutes including:  Patient performed left shoulder passive range of motion table slides for 1 minute hold, 3 repetitions.  Patient performed left shoulder passive range of motion external rotation stretch using cane for 1 minute hold, 3 repetitions.  Patient performed supine left shoulder active assist range of motion forward flexion using cane for 1 set of 10 repetitions.  Patient performed left shoulder external rotation using yellow theraband for 3 sets of 10 repetitions.  Patient performed left shoulder extension using yellow theraband for 3 sets of 10 repetitions.  Patient performed left shoulder abduction using 2# bar for 3 sets of 10 repetitions.  Patient performed left " bicep flexion using 3# weight for 3 sets of 10 repetitions.  Patient performed left shoulder shrug using 3# weight for 3 sets of 10 repetitions.  Patient performed left shoulder triceps extension using 3# weight for 3 sets of 10 repetitions.  Patient performed standing wall W's for 1 minute.  Patient performed wall walking for 2 minutes.    manual therapy techniques: Joint mobilizations were applied to the: right upper extremity for 25 minutes, including:  OT performed left shoulder passive range of motion forward flexion for 1 minute hold, 3 repetitions.  OT performed left shoulder passive range of motion scaption for 1 minute hold, 3 repetitions.  OT performed left shoulder passive range of motion external rotation for 1 minute hold, 3 repetitions.  OT performed left shoulder passive range of motion abducted external rotation for 1 minute hold, 3 repetitions.  OT performed left shoulder passive range of motion abducted internal rotation for 1 minute hold, 1 repetition.  OT performed left shoulder passive range of motion behind the back internal rotation for 1 minute hold, 3 repetitions.    Patient Education and Home Exercises     Education provided:   - positioning in HEP  - Progress towards goals     Written Home Exercises Provided: Patient instructed to cont prior HEP.  Exercises were reviewed and Marina was able to demonstrate them prior to the end of the session.  Marina demonstrated good  understanding of the home exercise program provided. See electronic medical record under Patient Instructions for exercises provided during therapy sessions.       Assessment     Patient reported no shoulder pain upon arrival. Patient reported compliance in home exercise program. Upon demonstration of home exercise program exercises, patient required repositioning cues in external rotation. She verbalized and demonstrated understanding. Patient tolerated all passive range of motion stretches well with no rest breaks. She  experienced most discomfort in externally rotated positions, including feeling one sudden, sharp pain in one repetition. Patient achieved fair passive range of motion in all motions. Will continue aggressive passive range of motion within patient's tolerance. OT introduced light resistance strengthening with good outcome. Patient reported no pain during therapeutic exercises. She demonstrated decreased muscular endurance in standing W's as patient stated she could do no more after 1 minute. Will continue to progress strengthening and muscular endurance as tolerated.     Marina is progressing well towards her goals and there are no updates to goals at this time. Pt prognosis is Fair.     Patient will continue to benefit from skilled outpatient occupational therapy to address the deficits listed in the problem list on initial evaluation provide patient/family education and to maximize patient's level of independence in the home and community environment.     Patient's spiritual, cultural and educational needs considered and patient agreeable to plan of care and goals.    Anticipated barriers to occupational therapy: None    Goals:  Short-term  Patient will demonstrate independence in home exercise program by 2/22/24. IN PROGRESS  Patient will demonstrate improved muscular endurance as evidenced by 2 or less rest breaks during manual techniques by 2/22/24. IN PROGRESS     Long-term  Patient will report 3/10 average pain in right upper extremity during functional and therapeutic tasks by 3/14/24. IN PROGRESS  Patient will report minimum right upper extremity pain in overhead grooming and bathing tasks by 3/14/24. IN PROGRESS  Patient will demonstrate 3+/5 MMT right shoulder flexion by 3/14/24. IN PROGRESS  Patient will demonstrate improved functional use of right upper extremity by 10% increase in FOTO score by 3/14/24. IN PROGRESS    Plan     Updates/Grading for next session: Progress aggressive range of motion and  strengthening as tolerated.    Mikaela Amador, OT   2/7/2024

## 2024-02-07 ENCOUNTER — CLINICAL SUPPORT (OUTPATIENT)
Dept: REHABILITATION | Facility: HOSPITAL | Age: 69
End: 2024-02-07
Payer: MEDICARE

## 2024-02-07 DIAGNOSIS — R29.898 WEAKNESS OF RIGHT UPPER EXTREMITY: Primary | ICD-10-CM

## 2024-02-07 PROCEDURE — 97140 MANUAL THERAPY 1/> REGIONS: CPT | Mod: PN

## 2024-02-07 PROCEDURE — 97110 THERAPEUTIC EXERCISES: CPT | Mod: PN

## 2024-02-21 ENCOUNTER — CLINICAL SUPPORT (OUTPATIENT)
Dept: REHABILITATION | Facility: HOSPITAL | Age: 69
End: 2024-02-21
Payer: MEDICARE

## 2024-02-21 DIAGNOSIS — R29.898 WEAKNESS OF RIGHT UPPER EXTREMITY: Primary | ICD-10-CM

## 2024-02-21 PROCEDURE — 97110 THERAPEUTIC EXERCISES: CPT | Mod: PN

## 2024-02-21 PROCEDURE — 97140 MANUAL THERAPY 1/> REGIONS: CPT | Mod: PN

## 2024-02-21 NOTE — PROGRESS NOTES
"[facility]  Occupational Therapy Treatment Note     Date: 2/21/2024  Name: Marina Stout  Clinic Number: [id number]     Therapy Diagnosis: Weakness of right upper extremity [R29.898]   Physician: David Miller MD     Physician Orders: Eval and Treat  Medical Diagnosis: M25.511 (ICD-10-CM) - Right shoulder pain   Surgical Procedure and Date: None recently, history of rotator cuff repairs  Evaluation Date: 2/1/2024  Insurance Authorization Period Expiration: 04/07/24  Plan of Care Certification Period: 2/1/24-3/14/24  Date of Return to MD: 2/24/24  Visit # / Visits authorized: 2 / 20  FOTO: 1/ 3     Precautions:  Standard     Time In: 9:28 AM  Time Out: 10:11 AM  Total Billable Time: 43 minutes    Subjective     Patient reports: "This arm (right shoulder) isn't bothering me. Now this one (left shoulder) is."  She was compliant with home exercise program given last session.   Response to previous treatment: "It was good."  Functional change: patient reported no functional changes since last session.    Pain: 0/10  Location: right shoulder      Objective     Objective Measures updated at progress report unless specified.    Patient reported enlarged cyst on right shoulder in which she is following up on with MD on 2/24/24.    Treatment     Marina received the treatments listed below:      therapeutic exercises to develop strength, endurance, ROM, and flexibility for 18 minutes including:  Patient performed pulleys for 5 minutes.  Patient performed right shoulder active assist external rotation using cane for 3 sets of 10 repetitions.  Patient performed right shoulder extension using 2# weight bar for 3 sets of 10 repetitions.  Patient performed right shoulder abduction using 2# bar for 3 sets of 10 repetitions.  Patient performed right bicep flexion using 3# weight for 3 sets of 10 repetitions.  Patient performed right shoulder shrug using 3# weight for 3 sets of 10 repetitions.  Patient " performed standing wall W's for 1 minute with 1 rest break.  Patient performed wall walking for 10 repetitions.  Patient performed supine left shoulder active assist range of motion forward flexion using cane for 1 set of 10 repetitions.  Patient performed right shoulder triceps extension using 3# weight for 3 sets of 10 repetitions.    manual therapy techniques: Joint mobilizations were applied to the: right upper extremity for 25 minutes, including:  OT performed right shoulder passive range of motion forward flexion for 1 minute hold, 3 repetitions.  OT performed right shoulder passive range of motion scaption for 1 minute hold, 3 repetitions.  OT performed right shoulder passive range of motion external rotation for 1 minute hold, 3 repetitions.  OT performed right shoulder passive range of motion abducted external rotation for 1 minute hold, 3 repetitions.  OT performed right shoulder passive range of motion abducted internal rotation for 1 minute hold, 1 repetition.  OT performed right shoulder passive range of motion behind the back internal rotation for 1 minute hold, 3 repetitions.    Patient Education and Home Exercises     Education provided:   - Progress towards goals     Written Home Exercises Provided: Patient instructed to cont prior HEP.  Exercises were reviewed and Marina was able to demonstrate them prior to the end of the session.  Marina demonstrated good  understanding of the home exercise program provided. See electronic medical record under Patient Instructions for exercises provided during therapy sessions.       Assessment     Patient reported no right shoulder pain upon arrival, however, she did not some discomfort in the left shoulder. Patient noted her right shoulder cyst has increased in size, and she is following up with MD on 2/24/24. Patient stated she believes the cyst previously caused a flare up in pain, but she does not feel pain unless engaging in heavy work tasks. Patient stated  "pulleys achieved good warm-up to her tolerance, and her right shoulder discomfort increased with motion as "it's been in one position for 14 years." Patient unable to perform external rotation actively, but she achieved good motion with active assist. Patient reported no difficulties performing shoulder extension with 2# weight bar. Patient performed all other exercises well with no report of pain. Patient required one rest break in standing W's, and she required rest after 10 repetitions of wall walking. Patient demonstrated fair shoulder joint motion in passive range of motion flexion and scaption, but she had good shoulder joint motion in external rotation and great joint motion internal rotation.    Marina is progressing well towards her goals. Achieved all short-term goals. Pt prognosis is Fair.     Patient will continue to benefit from skilled outpatient occupational therapy to address the deficits listed in the problem list on initial evaluation provide patient/family education and to maximize patient's level of independence in the home and community environment.     Patient's spiritual, cultural and educational needs considered and patient agreeable to plan of care and goals.    Anticipated barriers to occupational therapy: None    Goals:  Short-term  Patient will demonstrate independence in home exercise program by 2/22/24. GOAL MET  Patient will demonstrate improved muscular endurance as evidenced by 2 or less rest breaks during manual techniques by 2/22/24. GOAL MET     Long-term  Patient will report 3/10 average pain in right upper extremity during functional and therapeutic tasks by 3/14/24. IN PROGRESS  Patient will report minimum right upper extremity pain in overhead grooming and bathing tasks by 3/14/24. IN PROGRESS  Patient will demonstrate 3+/5 MMT right shoulder flexion by 3/14/24. IN PROGRESS  Patient will demonstrate improved functional use of right upper extremity by 10% increase in FOTO score " by 3/14/24. IN PROGRESS    Plan     Updates/Grading for next session: Progress aggressive range of motion and strengthening as tolerated.    Mikaela Amador, OT   2/21/2024

## 2024-02-27 NOTE — PROGRESS NOTES
"[facility]  Occupational Therapy Treatment Note     Date: 2/28/2024  Name: Marina Stout  Clinic Number: [id number]     Therapy Diagnosis: Weakness of right upper extremity [R29.898]   Physician: David Miller MD     Physician Orders: Eval and Treat  Medical Diagnosis: M25.511 (ICD-10-CM) - Right shoulder pain   Surgical Procedure and Date: None recently, history of rotator cuff repairs  Evaluation Date: 2/1/2024  Insurance Authorization Period Expiration: 04/07/24  Plan of Care Certification Period: 2/1/24-3/14/24  Date of Return to MD: Not yet scheduled  Visit # / Visits authorized: 3 / 20  FOTO: 2/ 3     Precautions:  Standard     Time In: 9:21 AM  Time Out: 10:04 AM  Total Billable Time: 43 minutes    Subjective     Patient reports: "Well, I'm getting a reverse shoulder surgery. I'm waiting to see when."  She was compliant with home exercise program given last session.   Response to previous treatment: "It was good."  Functional change: patient reported no functional changes since last session.    Pain: 1/10  Location: right shoulder      Objective     Objective Measures updated at progress report unless specified.    Right Shoulder Range of Motion  Flexion: 108  Extension: 74  Abduction: 92  Internal Rotation: T12  External Rotation: 0    FOTO: 50%    Treatment     Marina received the treatments listed below:      therapeutic exercises to develop strength, endurance, ROM, and flexibility for 18 minutes including:  Patient performed pulleys for 5 minutes.  Patient performed right shoulder active assist external rotation using cane for 3 sets of 10 repetitions.  Patient performed right shoulder extension using 2# weight bar for 3 sets of 10 repetitions.  Patient performed right shoulder abduction using 2# bar for 3 sets of 10 repetitions.  Patient performed right bicep flexion using 3# weight for 3 sets of 10 repetitions.  Patient performed right shoulder shrug using 3# weight for 3 " sets of 10 repetitions.  Patient performed standing wall W's for 45 seconds.  Patient performed wall walking for 10 repetitions.  Patient performed supine left shoulder active assist range of motion forward flexion using cane for 1 set of 10 repetitions.  Patient performed supine right shoulder triceps extension using 2# weight bar for 3 sets of 10 repetitions.  Patient performed supine bench press using 3# weight for 3 sets of 10 repetitions.  Patient performed supine serratus punches using 3# weight for 3 sets of 10 repetitions.  Patient performed supine internal to external rotation using 2# weight for 3 sets of 10 repetitions.  Patient performed supine flies using 2# weight for 3 sets of 10 repetitions.    manual therapy techniques: Joint mobilizations were applied to the: right upper extremity for 25 minutes, including:  OT performed right shoulder passive range of motion forward flexion for 1 minute hold, 3 repetitions.  OT performed right shoulder passive range of motion scaption for 1 minute hold, 3 repetitions.  OT performed right shoulder passive range of motion external rotation for 1 minute hold, 3 repetitions.  OT performed right shoulder passive range of motion abducted external rotation for 1 minute hold, 3 repetitions.  OT performed right shoulder passive range of motion abducted internal rotation for 1 minute hold, 3 repetitions.  OT performed right shoulder passive range of motion behind the back internal rotation for 1 minute hold, 3 repetitions.    Patient Education and Home Exercises     Education provided:   - Progress towards goals     Written Home Exercises Provided: Patient instructed to cont prior HEP.  Exercises were reviewed and Marina was able to demonstrate them prior to the end of the session.  Marina demonstrated good  understanding of the home exercise program provided. See electronic medical record under Patient Instructions for exercises provided during therapy sessions.        Assessment     Patient reported that she is anticipating right reverse shoulder surgery with cyst removal soon, but has not yet scheduled. Patient would like to continue therapy until surgery to continue strengthening and progressing range of motion. Patient reported mild pain upon arrival. Patient demonstrated decreased muscular endurance as she feels she did not have enough sleep last night. Patient required to end wall W's due to fatigue. Introduced weight bar to improve form in triceps extensions with good outcome. Patient demonstrated decreased active external rotation, however she achieved good motion passively. Patient performed all other therapeutic exercises well. Patient demonstrated good joint mobility in all planes during passive range of motion with no complaint of pain. She demonstrated fairly good passive range of motion flexion and good passive range of motion in all other motions.    Marina is progressing well towards her goals. No updates to goals. Pt prognosis is Fair.     Patient will continue to benefit from skilled outpatient occupational therapy to address the deficits listed in the problem list on initial evaluation provide patient/family education and to maximize patient's level of independence in the home and community environment.     Patient's spiritual, cultural and educational needs considered and patient agreeable to plan of care and goals.    Anticipated barriers to occupational therapy: None    Goals:  Short-term  Patient will demonstrate independence in home exercise program by 2/22/24. GOAL MET  Patient will demonstrate improved muscular endurance as evidenced by 2 or less rest breaks during manual techniques by 2/22/24. GOAL MET     Long-term  Patient will report 3/10 average pain in right upper extremity during functional and therapeutic tasks by 3/14/24. IN PROGRESS  Patient will report minimum right upper extremity pain in overhead grooming and bathing tasks by 3/14/24. IN  PROGRESS  Patient will demonstrate 3+/5 MMT right shoulder flexion by 3/14/24. IN PROGRESS  Patient will demonstrate improved functional use of right upper extremity by 10% increase in FOTO score by 3/14/24. IN PROGRESS    Plan     Updates/Grading for next session: Progress aggressive range of motion and strengthening as tolerated.    Mikaeal Amador OT   2/28/2024

## 2024-02-28 ENCOUNTER — CLINICAL SUPPORT (OUTPATIENT)
Dept: REHABILITATION | Facility: HOSPITAL | Age: 69
End: 2024-02-28
Payer: MEDICARE

## 2024-02-28 DIAGNOSIS — R29.898 WEAKNESS OF RIGHT UPPER EXTREMITY: Primary | ICD-10-CM

## 2024-02-28 PROCEDURE — 97110 THERAPEUTIC EXERCISES: CPT | Mod: PN

## 2024-02-28 PROCEDURE — 97140 MANUAL THERAPY 1/> REGIONS: CPT | Mod: PN

## 2024-03-05 NOTE — PROGRESS NOTES
"[facility]  Occupational Therapy Treatment Note     Date: 3/6/2024  Name: Marina Stout  Clinic Number: [id number]     Therapy Diagnosis: Weakness of right upper extremity [R29.898]   Physician: David Miller MD     Physician Orders: Eval and Treat  Medical Diagnosis: M25.511 (ICD-10-CM) - Right shoulder pain   Surgical Procedure and Date: None recently, history of rotator cuff repairs  Evaluation Date: 2/1/2024  Insurance Authorization Period Expiration: 04/07/24  Plan of Care Certification Period: 2/1/24-3/14/24  Date of Return to MD: Not yet scheduled  Visit # / Visits authorized: 4 / 20  FOTO: 2/ 3     Precautions:  Standard     Time In: 8:46 AM  Time Out: 9:30 AM  Total Billable Time: 44 minutes    Subjective     Patient reports: "My doctor said there's really nothing I can do until I get surgery. My bump (right shoulder cyst) is getting bigger with more pressure. It hurts with more use of my arm. But if I drain it, it will just come back."  She was compliant with home exercise program given last session.   Response to previous treatment: "It was good."  Functional change: patient reported gardening and lifting heavy plant pots.    Pain: 0/10  Location: right shoulder      Objective     Objective Measures updated at progress report unless specified.    Treatment     Marina received the treatments listed below:      therapeutic exercises to develop strength, endurance, ROM, and flexibility for 19 minutes including:  Patient performed pulleys for 5 minutes.  Patient performed right shoulder extension using 3# weight bar for 3 sets of 10 repetitions.  Patient performed right shoulder abduction using 3# bar for 3 sets of 10 repetitions.  Patient performed right bicep flexion using 4# weight for 3 sets of 10 repetitions.  Patient performed right shoulder shrug using 4# weight for 3 sets of 10 repetitions.  Patient performed right scapular row using 4# weight for 3 sets of 10 " repetitions.  Patient performed standing wall W's for 30 seconds, twice with 1 rest break.  Patient performed right shoulder internal rotation using yellow theraband for 3 sets of 10 repetitions.  Patient performed supine left shoulder active assist range of motion forward flexion using cane for 3 set of 10 repetitions.  Patient performed supine right shoulder triceps extension using 3# weight bar for 3 sets of 10 repetitions.  Patient performed supine bench press using 4# weight for 3 sets of 10 repetitions.  Patient performed supine serratus punches using 4# weight for 3 sets of 10 repetitions.  Patient performed supine internal to external rotation using 3# weight for 3 sets of 10 repetitions.  Patient performed supine flies using 3# weight for 3 sets of 10 repetitions.    manual therapy techniques: Joint mobilizations were applied to the: right upper extremity for 25 minutes, including:  OT performed right shoulder passive range of motion forward flexion for 1 minute hold, 3 repetitions.  OT performed right shoulder passive range of motion scaption for 1 minute hold, 3 repetitions.  OT performed right shoulder passive range of motion external rotation for 1 minute hold, 3 repetitions.  OT performed right shoulder passive range of motion abducted external rotation for 1 minute hold, 3 repetitions.  OT performed right shoulder passive range of motion abducted internal rotation for 1 minute hold, 3 repetitions.  OT performed right shoulder passive range of motion behind the back internal rotation for 1 minute hold, 3 repetitions.    Patient Education and Home Exercises     Education provided:   - Progress towards goals     Written Home Exercises Provided: Patient instructed to cont prior HEP.  Exercises were reviewed and Marina was able to demonstrate them prior to the end of the session.  Marina demonstrated good  understanding of the home exercise program provided. See electronic medical record under Patient  Instructions for exercises provided during therapy sessions.       Assessment     Patient arrived stating she is still considering right reverse shoulder surgery with cyst removal. Patient noted cyst is increasing in size, and the cyst becomes more painful with increased use of her right upper extremity. Patient would like to continue strengthening right shoulder prior to surgery. Patient is using right upper extremity in functional tasks such as gardening with mild right shoulder pain when engaging in heavy lifting. Patient tolerated increased weighted resistance by 1# in all therapeutic exercises using good form and no report of pain. Patient continues to demonstrate trace movements in active shoulder external rotation, and she reported great challenge in wall W's. Patient benefits from occasional rest break due to muscular fatigue. Patient demonstrated good joint mobility in all planes during passive range of motion with no complaint of pain, however, OT did not persist past 90 degrees in scaption to reduce irritation of cyst.     Marina is progressing well towards her goals. No updates to goals. Pt prognosis is Fair.     Patient will continue to benefit from skilled outpatient occupational therapy to address the deficits listed in the problem list on initial evaluation provide patient/family education and to maximize patient's level of independence in the home and community environment.     Patient's spiritual, cultural and educational needs considered and patient agreeable to plan of care and goals.    Anticipated barriers to occupational therapy: None    Goals:  Short-term  Patient will demonstrate independence in home exercise program by 2/22/24. GOAL MET  Patient will demonstrate improved muscular endurance as evidenced by 2 or less rest breaks during manual techniques by 2/22/24. GOAL MET     Long-term  Patient will report 3/10 average pain in right upper extremity during functional and therapeutic tasks by  3/14/24. IN PROGRESS  Patient will report minimum right upper extremity pain in overhead grooming and bathing tasks by 3/14/24. IN PROGRESS  Patient will demonstrate 3+/5 MMT right shoulder flexion by 3/14/24. IN PROGRESS  Patient will demonstrate improved functional use of right upper extremity by 10% increase in FOTO score by 3/14/24. IN PROGRESS    Plan     Updates/Grading for next session: Progress aggressive range of motion and strengthening as tolerated.    Mikaela Amador OT   3/6/2024

## 2024-03-06 ENCOUNTER — CLINICAL SUPPORT (OUTPATIENT)
Dept: REHABILITATION | Facility: HOSPITAL | Age: 69
End: 2024-03-06
Payer: MEDICARE

## 2024-03-06 DIAGNOSIS — R29.898 WEAKNESS OF RIGHT UPPER EXTREMITY: Primary | ICD-10-CM

## 2024-03-06 PROCEDURE — 97110 THERAPEUTIC EXERCISES: CPT | Mod: PN

## 2024-03-06 PROCEDURE — 97140 MANUAL THERAPY 1/> REGIONS: CPT | Mod: PN
